# Patient Record
(demographics unavailable — no encounter records)

---

## 2025-01-13 NOTE — PROCEDURE
[de-identified] : Knee Joint Cortisone Injection, Left: Verbal consent was obtained from the patient for a left knee injection after the risks and benefits were discussed. The patient was made comfortable in the seated position with the knee at 90 degrees hanging over the table. The patients knee injection site was then sterilely prepped. Local anesthetic was used to desensitize the skin prior to the injection. A sterile 22-guage needle on a sterile syringe was used to introduce the injectable liquid into the knee joint through an inferior parapatellar tendon approach. The needle was withdrawn, and a sterile band-aid was placed over the injection site. Pressure was applied to the injection site for several minutes to help with hemostasis at the injection site. The patient tolerated the procedure well.   Medications injected into the knee: a. Steroid: Celestone 6mg/ml, 1ml injected. b. Local anesthetic: Lidocaine1% 1ml, Marcaine 0.25% 1ml

## 2025-01-13 NOTE — REVIEW OF SYSTEMS
[Joint Pain] : joint pain [Joint Stiffness] : joint stiffness [Negative] : Heme/Lymph [FreeTextEntry9] : Arthritis  [FreeTextEntry4] : Ringing in ear

## 2025-01-13 NOTE — DISCUSSION/SUMMARY
[de-identified] : Impression: 78-year-old female with mild anterior knee pain. Exam positive only for anterior PF pain with grind test on the left not the right. Ligaments stable. No significant joint line pain. No meniscal symptoms History of osteopenia/osteoporosis History of non surgically treated lateral tibial plateau fractures.  Plan: No surgical intervention needed. injected left knee with CSI to help with mild PF symptoms Old tibial plateau fractures are asymptomatic. Follow-up as needed.

## 2025-01-13 NOTE — PROCEDURE
[de-identified] : Knee Joint Cortisone Injection, Left: Verbal consent was obtained from the patient for a left knee injection after the risks and benefits were discussed. The patient was made comfortable in the seated position with the knee at 90 degrees hanging over the table. The patients knee injection site was then sterilely prepped. Local anesthetic was used to desensitize the skin prior to the injection. A sterile 22-guage needle on a sterile syringe was used to introduce the injectable liquid into the knee joint through an inferior parapatellar tendon approach. The needle was withdrawn, and a sterile band-aid was placed over the injection site. Pressure was applied to the injection site for several minutes to help with hemostasis at the injection site. The patient tolerated the procedure well.   Medications injected into the knee: a. Steroid: Celestone 6mg/ml, 1ml injected. b. Local anesthetic: Lidocaine1% 1ml, Marcaine 0.25% 1ml

## 2025-01-13 NOTE — PHYSICAL EXAM
[de-identified] : Left Knee/Lower Extremity:   Skin: Normal. No erythema, no ecchymosis, no abrasions, no scratches, no tattoos.                  Old Incisions: None.    Knee Joint Swelling: Mild.    Popliteal Swelling: None.     Pre-Patella Bursa Swelling: None.   Alignment: Neutral   ROM Extension: 0 degrees.   ROM Flexion:  100 degrees.     Knee Joint Line Tenderness: Tender in the patellofemoral compartment. Not tender at medial joint line.   Not tender at the lateral joint line.     McMurrays Test: Negative.   Soft Tissue Tenderness:  None.   Patella Compression Test: Positive   Patellofemoral Crepitus: Present.   Patellofemoral  Apprehension Testing: Mildly positive   Patellofemoral Laxity: normal   Medial Collateral Ligament Laxity: mild laxity. Good endpoint.                                                        Lateral Collateral Ligament Laxity: Normal laxity. Good endpoint.   ACL Testing: Stable ACL. Good Endpoint.  Lachman Test: Negative Anterior Drawer Test: Negative   PCL Testing:     Stable PCL.  Good Endpoint.                                                                  Posterior Drawer Test: Negative   Motor Strength:     Quadriceps strength is 5 out of 5 Hamstring strength is 5 out of 5 Ankle dorsiflexion strength is 5 out of 5 Ankle plantarflexion strength is 5 out of 5   Sensation:    Light tough sensation in the lower extremity is grossly normal.    Pulses:      Pulses are palpable at the ankle at the Dorsalis Pedis Artery.  Pulses are palpable at the Posterior Tibialis Artery.   Lumbar Spine Symptoms: Negative straight leg raise.    Leg Lengths: Symmetric.    Gait:  Normal Gait.    Assistive Devices:  None  [de-identified] : Left Knee: AP Standing View:  Medial joint space preserved. Lateral joint space mild decrease. No Femoral osteophytes at the joint line medially.  No Femoral osteophytes at the joint line laterally.  No Tibial osteophytes at the joint line medially. Irregularity to the lateral tibial metaphysis with sclerosis in the bone from old fracture healing.    PA Flexion View:  Medial joint space preserved. Lateral joint space preserved. No Femoral osteophytes. No Tibial osteophytes  Lateral View:  No Osteophytes seen in the posterior femoral condyle. No Osteophytes seen in the posterior tibial plateau. No Osteophytes seen at the Anterior tibial plateau  [place] View:  Patellofemoral joint space is mildly decreased laterally. Cystic appearance to the distal femur medial and laterally with decreased density.   Bilateral Hip to Ankle Standing View: Right Knee Alignment: Neutral Left Knee Alignment: Neutral   Hip Views: mild decrease in joint space inferiorly. Ankle Views: No significant changes seen in the ankle joints with preserved spaces.

## 2025-01-13 NOTE — HISTORY OF PRESENT ILLNESS
[de-identified] :  Jessica Carrington is a 78-year-old female who presents today with left knee pain that started 1.5 years ears ago. 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 She states that she broke her tibial plateau 3 times over the last 30 years (20 years ago, 10 years ago and May of 2023 horseback riding) and is here today to make sure she isn't doing any further damage to the knee. Patient was referred by  and Mrs. Alamo. Her main complaint is anterior knee pain.  [Left Knee]   Onset of Knee Symptoms: # 1.5 years   Knee Symptoms: Pain with Activity, Swelling front knee, Loss of Motion  Location of Pain: Behind Kneecap   Duration of Pain: Daily  Intensity of Pain: Pain Level (0-10): Moderate   Character of Pain: Stabbing Pain   Painful Activities: Squatting, Lunges Stairs Going:  Down     Knee History: Knee Trauma:  Yes   Non-Surgical Knee Treatments: Rest, Ice, Physical Therapy  Surgical Knee History: None

## 2025-01-13 NOTE — HISTORY OF PRESENT ILLNESS
[de-identified] :  Jessica Carrington is a 78-year-old female who presents today with left knee pain that started 1.5 years ears ago. 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 She states that she broke her tibial plateau 3 times over the last 30 years (20 years ago, 10 years ago and May of 2023 horseback riding) and is here today to make sure she isn't doing any further damage to the knee. Patient was referred by  and Mrs. Alamo. Her main complaint is anterior knee pain.  [Left Knee]   Onset of Knee Symptoms: # 1.5 years   Knee Symptoms: Pain with Activity, Swelling front knee, Loss of Motion  Location of Pain: Behind Kneecap   Duration of Pain: Daily  Intensity of Pain: Pain Level (0-10): Moderate   Character of Pain: Stabbing Pain   Painful Activities: Squatting, Lunges Stairs Going:  Down     Knee History: Knee Trauma:  Yes   Non-Surgical Knee Treatments: Rest, Ice, Physical Therapy  Surgical Knee History: None

## 2025-01-13 NOTE — DISCUSSION/SUMMARY
[de-identified] : Impression: 78-year-old female with mild anterior knee pain. Exam positive only for anterior PF pain with grind test on the left not the right. Ligaments stable. No significant joint line pain. No meniscal symptoms History of osteopenia/osteoporosis History of non surgically treated lateral tibial plateau fractures.  Plan: No surgical intervention needed. injected left knee with CSI to help with mild PF symptoms Old tibial plateau fractures are asymptomatic. Follow-up as needed.

## 2025-01-13 NOTE — HISTORY OF PRESENT ILLNESS
[de-identified] :  Jessica Carrington is a 78-year-old female who presents today with left knee pain that started 1.5 years ears ago. 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 She states that she broke her tibial plateau 3 times over the last 30 years (20 years ago, 10 years ago and May of 2023 horseback riding) and is here today to make sure she isn't doing any further damage to the knee. Patient was referred by  and Mrs. Alamo. Her main complaint is anterior knee pain.  [Left Knee]   Onset of Knee Symptoms: # 1.5 years   Knee Symptoms: Pain with Activity, Swelling front knee, Loss of Motion  Location of Pain: Behind Kneecap   Duration of Pain: Daily  Intensity of Pain: Pain Level (0-10): Moderate   Character of Pain: Stabbing Pain   Painful Activities: Squatting, Lunges Stairs Going:  Down     Knee History: Knee Trauma:  Yes   Non-Surgical Knee Treatments: Rest, Ice, Physical Therapy  Surgical Knee History: None

## 2025-01-13 NOTE — PHYSICAL EXAM
[de-identified] : Left Knee/Lower Extremity:   Skin: Normal. No erythema, no ecchymosis, no abrasions, no scratches, no tattoos.                  Old Incisions: None.    Knee Joint Swelling: Mild.    Popliteal Swelling: None.     Pre-Patella Bursa Swelling: None.   Alignment: Neutral   ROM Extension: 0 degrees.   ROM Flexion:  100 degrees.     Knee Joint Line Tenderness: Tender in the patellofemoral compartment. Not tender at medial joint line.   Not tender at the lateral joint line.     McMurrays Test: Negative.   Soft Tissue Tenderness:  None.   Patella Compression Test: Positive   Patellofemoral Crepitus: Present.   Patellofemoral  Apprehension Testing: Mildly positive   Patellofemoral Laxity: normal   Medial Collateral Ligament Laxity: mild laxity. Good endpoint.                                                        Lateral Collateral Ligament Laxity: Normal laxity. Good endpoint.   ACL Testing: Stable ACL. Good Endpoint.  Lachman Test: Negative Anterior Drawer Test: Negative   PCL Testing:     Stable PCL.  Good Endpoint.                                                                  Posterior Drawer Test: Negative   Motor Strength:     Quadriceps strength is 5 out of 5 Hamstring strength is 5 out of 5 Ankle dorsiflexion strength is 5 out of 5 Ankle plantarflexion strength is 5 out of 5   Sensation:    Light tough sensation in the lower extremity is grossly normal.    Pulses:      Pulses are palpable at the ankle at the Dorsalis Pedis Artery.  Pulses are palpable at the Posterior Tibialis Artery.   Lumbar Spine Symptoms: Negative straight leg raise.    Leg Lengths: Symmetric.    Gait:  Normal Gait.    Assistive Devices:  None  [de-identified] : Left Knee: AP Standing View:  Medial joint space preserved. Lateral joint space mild decrease. No Femoral osteophytes at the joint line medially.  No Femoral osteophytes at the joint line laterally.  No Tibial osteophytes at the joint line medially. Irregularity to the lateral tibial metaphysis with sclerosis in the bone from old fracture healing.    PA Flexion View:  Medial joint space preserved. Lateral joint space preserved. No Femoral osteophytes. No Tibial osteophytes  Lateral View:  No Osteophytes seen in the posterior femoral condyle. No Osteophytes seen in the posterior tibial plateau. No Osteophytes seen at the Anterior tibial plateau  [place] View:  Patellofemoral joint space is mildly decreased laterally. Cystic appearance to the distal femur medial and laterally with decreased density.   Bilateral Hip to Ankle Standing View: Right Knee Alignment: Neutral Left Knee Alignment: Neutral   Hip Views: mild decrease in joint space inferiorly. Ankle Views: No significant changes seen in the ankle joints with preserved spaces.

## 2025-01-13 NOTE — DISCUSSION/SUMMARY
[de-identified] : Impression: 78-year-old female with mild anterior knee pain. Exam positive only for anterior PF pain with grind test on the left not the right. Ligaments stable. No significant joint line pain. No meniscal symptoms History of osteopenia/osteoporosis History of non surgically treated lateral tibial plateau fractures.  Plan: No surgical intervention needed. injected left knee with CSI to help with mild PF symptoms Old tibial plateau fractures are asymptomatic. Follow-up as needed.

## 2025-01-13 NOTE — PROCEDURE
[de-identified] : Knee Joint Cortisone Injection, Left: Verbal consent was obtained from the patient for a left knee injection after the risks and benefits were discussed. The patient was made comfortable in the seated position with the knee at 90 degrees hanging over the table. The patients knee injection site was then sterilely prepped. Local anesthetic was used to desensitize the skin prior to the injection. A sterile 22-guage needle on a sterile syringe was used to introduce the injectable liquid into the knee joint through an inferior parapatellar tendon approach. The needle was withdrawn, and a sterile band-aid was placed over the injection site. Pressure was applied to the injection site for several minutes to help with hemostasis at the injection site. The patient tolerated the procedure well.   Medications injected into the knee: a. Steroid: Celestone 6mg/ml, 1ml injected. b. Local anesthetic: Lidocaine1% 1ml, Marcaine 0.25% 1ml

## 2025-01-13 NOTE — DISCUSSION/SUMMARY
[de-identified] : Impression: 78-year-old female with mild anterior knee pain. Exam positive only for anterior PF pain with grind test on the left not the right. Ligaments stable. No significant joint line pain. No meniscal symptoms History of osteopenia/osteoporosis History of non surgically treated lateral tibial plateau fractures.  Plan: No surgical intervention needed. injected left knee with CSI to help with mild PF symptoms Old tibial plateau fractures are asymptomatic. Follow-up as needed.

## 2025-01-13 NOTE — HISTORY OF PRESENT ILLNESS
[de-identified] :  Jessica Carrington is a 78-year-old female who presents today with left knee pain that started 1.5 years ears ago. 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 She states that she broke her tibial plateau 3 times over the last 30 years (20 years ago, 10 years ago and May of 2023 horseback riding) and is here today to make sure she isn't doing any further damage to the knee. Patient was referred by  and Mrs. Alamo. Her main complaint is anterior knee pain.  [Left Knee]   Onset of Knee Symptoms: # 1.5 years   Knee Symptoms: Pain with Activity, Swelling front knee, Loss of Motion  Location of Pain: Behind Kneecap   Duration of Pain: Daily  Intensity of Pain: Pain Level (0-10): Moderate   Character of Pain: Stabbing Pain   Painful Activities: Squatting, Lunges Stairs Going:  Down     Knee History: Knee Trauma:  Yes   Non-Surgical Knee Treatments: Rest, Ice, Physical Therapy  Surgical Knee History: None

## 2025-01-13 NOTE — PHYSICAL EXAM
[de-identified] : Left Knee/Lower Extremity:   Skin: Normal. No erythema, no ecchymosis, no abrasions, no scratches, no tattoos.                  Old Incisions: None.    Knee Joint Swelling: Mild.    Popliteal Swelling: None.     Pre-Patella Bursa Swelling: None.   Alignment: Neutral   ROM Extension: 0 degrees.   ROM Flexion:  100 degrees.     Knee Joint Line Tenderness: Tender in the patellofemoral compartment. Not tender at medial joint line.   Not tender at the lateral joint line.     McMurrays Test: Negative.   Soft Tissue Tenderness:  None.   Patella Compression Test: Positive   Patellofemoral Crepitus: Present.   Patellofemoral  Apprehension Testing: Mildly positive   Patellofemoral Laxity: normal   Medial Collateral Ligament Laxity: mild laxity. Good endpoint.                                                        Lateral Collateral Ligament Laxity: Normal laxity. Good endpoint.   ACL Testing: Stable ACL. Good Endpoint.  Lachman Test: Negative Anterior Drawer Test: Negative   PCL Testing:     Stable PCL.  Good Endpoint.                                                                  Posterior Drawer Test: Negative   Motor Strength:     Quadriceps strength is 5 out of 5 Hamstring strength is 5 out of 5 Ankle dorsiflexion strength is 5 out of 5 Ankle plantarflexion strength is 5 out of 5   Sensation:    Light tough sensation in the lower extremity is grossly normal.    Pulses:      Pulses are palpable at the ankle at the Dorsalis Pedis Artery.  Pulses are palpable at the Posterior Tibialis Artery.   Lumbar Spine Symptoms: Negative straight leg raise.    Leg Lengths: Symmetric.    Gait:  Normal Gait.    Assistive Devices:  None  [de-identified] : Left Knee: AP Standing View:  Medial joint space preserved. Lateral joint space mild decrease. No Femoral osteophytes at the joint line medially.  No Femoral osteophytes at the joint line laterally.  No Tibial osteophytes at the joint line medially. Irregularity to the lateral tibial metaphysis with sclerosis in the bone from old fracture healing.    PA Flexion View:  Medial joint space preserved. Lateral joint space preserved. No Femoral osteophytes. No Tibial osteophytes  Lateral View:  No Osteophytes seen in the posterior femoral condyle. No Osteophytes seen in the posterior tibial plateau. No Osteophytes seen at the Anterior tibial plateau  [place] View:  Patellofemoral joint space is mildly decreased laterally. Cystic appearance to the distal femur medial and laterally with decreased density.   Bilateral Hip to Ankle Standing View: Right Knee Alignment: Neutral Left Knee Alignment: Neutral   Hip Views: mild decrease in joint space inferiorly. Ankle Views: No significant changes seen in the ankle joints with preserved spaces.

## 2025-01-13 NOTE — PHYSICAL EXAM
[de-identified] : Left Knee/Lower Extremity:   Skin: Normal. No erythema, no ecchymosis, no abrasions, no scratches, no tattoos.                  Old Incisions: None.    Knee Joint Swelling: Mild.    Popliteal Swelling: None.     Pre-Patella Bursa Swelling: None.   Alignment: Neutral   ROM Extension: 0 degrees.   ROM Flexion:  100 degrees.     Knee Joint Line Tenderness: Tender in the patellofemoral compartment. Not tender at medial joint line.   Not tender at the lateral joint line.     McMurrays Test: Negative.   Soft Tissue Tenderness:  None.   Patella Compression Test: Positive   Patellofemoral Crepitus: Present.   Patellofemoral  Apprehension Testing: Mildly positive   Patellofemoral Laxity: normal   Medial Collateral Ligament Laxity: mild laxity. Good endpoint.                                                        Lateral Collateral Ligament Laxity: Normal laxity. Good endpoint.   ACL Testing: Stable ACL. Good Endpoint.  Lachman Test: Negative Anterior Drawer Test: Negative   PCL Testing:     Stable PCL.  Good Endpoint.                                                                  Posterior Drawer Test: Negative   Motor Strength:     Quadriceps strength is 5 out of 5 Hamstring strength is 5 out of 5 Ankle dorsiflexion strength is 5 out of 5 Ankle plantarflexion strength is 5 out of 5   Sensation:    Light tough sensation in the lower extremity is grossly normal.    Pulses:      Pulses are palpable at the ankle at the Dorsalis Pedis Artery.  Pulses are palpable at the Posterior Tibialis Artery.   Lumbar Spine Symptoms: Negative straight leg raise.    Leg Lengths: Symmetric.    Gait:  Normal Gait.    Assistive Devices:  None  [de-identified] : Left Knee: AP Standing View:  Medial joint space preserved. Lateral joint space mild decrease. No Femoral osteophytes at the joint line medially.  No Femoral osteophytes at the joint line laterally.  No Tibial osteophytes at the joint line medially. Irregularity to the lateral tibial metaphysis with sclerosis in the bone from old fracture healing.    PA Flexion View:  Medial joint space preserved. Lateral joint space preserved. No Femoral osteophytes. No Tibial osteophytes  Lateral View:  No Osteophytes seen in the posterior femoral condyle. No Osteophytes seen in the posterior tibial plateau. No Osteophytes seen at the Anterior tibial plateau  [place] View:  Patellofemoral joint space is mildly decreased laterally. Cystic appearance to the distal femur medial and laterally with decreased density.   Bilateral Hip to Ankle Standing View: Right Knee Alignment: Neutral Left Knee Alignment: Neutral   Hip Views: mild decrease in joint space inferiorly. Ankle Views: No significant changes seen in the ankle joints with preserved spaces.